# Patient Record
Sex: FEMALE | Race: WHITE
[De-identification: names, ages, dates, MRNs, and addresses within clinical notes are randomized per-mention and may not be internally consistent; named-entity substitution may affect disease eponyms.]

---

## 2017-02-06 ENCOUNTER — HOSPITAL ENCOUNTER (EMERGENCY)
Age: 37
Discharge: HOME | End: 2017-02-06
Payer: COMMERCIAL

## 2017-02-06 DIAGNOSIS — R10.2: Primary | ICD-10-CM

## 2017-02-06 PROCEDURE — 76856 US EXAM PELVIC COMPLETE: CPT

## 2017-02-06 PROCEDURE — 93975 VASCULAR STUDY: CPT

## 2017-02-06 PROCEDURE — 99283 EMERGENCY DEPT VISIT LOW MDM: CPT

## 2017-02-06 PROCEDURE — 99284 EMERGENCY DEPT VISIT MOD MDM: CPT

## 2017-02-06 PROCEDURE — 81001 URINALYSIS AUTO W/SCOPE: CPT

## 2017-02-06 PROCEDURE — 81025 URINE PREGNANCY TEST: CPT

## 2017-08-01 ENCOUNTER — HOSPITAL ENCOUNTER (EMERGENCY)
Dept: HOSPITAL 76 - ED | Age: 37
Discharge: HOME | End: 2017-08-01
Payer: COMMERCIAL

## 2017-08-01 VITALS — DIASTOLIC BLOOD PRESSURE: 80 MMHG | SYSTOLIC BLOOD PRESSURE: 117 MMHG

## 2017-08-01 DIAGNOSIS — Z87.42: ICD-10-CM

## 2017-08-01 DIAGNOSIS — N70.11: Primary | ICD-10-CM

## 2017-08-01 DIAGNOSIS — M79.7: ICD-10-CM

## 2017-08-01 LAB
ALBUMIN/GLOB SERPL: 1.1 {RATIO} (ref 1–2.2)
ANION GAP SERPL CALCULATED.4IONS-SCNC: 10 MMOL/L (ref 6–13)
BASOPHILS NFR BLD AUTO: 0.1 10^3/UL (ref 0–0.1)
BASOPHILS NFR BLD AUTO: 0.5 %
BILIRUB BLD-MCNC: 0.4 MG/DL (ref 0.2–1)
BUN SERPL-MCNC: 6 MG/DL (ref 6–20)
CALCIUM UR-MCNC: 9.6 MG/DL (ref 8.5–10.3)
CHLORIDE SERPL-SCNC: 101 MMOL/L (ref 101–111)
CO2 SERPL-SCNC: 27 MMOL/L (ref 21–32)
CREAT SERPLBLD-SCNC: 0.7 MG/DL (ref 0.4–1)
CUL URINE ADD CHARGE: (no result)
EOSINOPHIL # BLD AUTO: 0.2 10^3/UL (ref 0–0.7)
EOSINOPHIL NFR BLD AUTO: 1.9 %
ERYTHROCYTE [DISTWIDTH] IN BLOOD BY AUTOMATED COUNT: 13.7 % (ref 12–15)
GFRSERPLBLD MDRD-ARVRAT: 94 ML/MIN/{1.73_M2} (ref 89–?)
GLOBULIN SER-MCNC: 3.9 G/DL (ref 2.1–4.2)
GLUCOSE SERPL-MCNC: 109 MG/DL (ref 70–100)
HCT VFR BLD AUTO: 39.6 % (ref 37–47)
HGB UR QL STRIP: 13.6 G/DL (ref 12–16)
LIPASE SERPL-CCNC: 24 U/L (ref 22–51)
LYMPHOCYTES # SPEC AUTO: 2.3 10^3/UL (ref 1.5–3.5)
LYMPHOCYTES NFR BLD AUTO: 22 %
MCH RBC QN AUTO: 28.6 PG (ref 27–31)
MCHC RBC AUTO-ENTMCNC: 34.4 G/DL (ref 32–36)
MCV RBC AUTO: 83.1 FL (ref 81–99)
MONOCYTES # BLD AUTO: 0.6 10^3/UL (ref 0–1)
MONOCYTES NFR BLD AUTO: 5.5 %
NEUTROPHILS # BLD AUTO: 7.5 10^3/UL (ref 1.5–6.6)
NEUTROPHILS # SNV AUTO: 10.7 X10^3/UL (ref 4.8–10.8)
NEUTROPHILS NFR BLD AUTO: 70.1 %
NRBC # BLD AUTO: 0 /100WBC
PDW BLD AUTO: 7.4 FL (ref 7.9–10.8)
PH UR STRIP.AUTO: 6.5 PH (ref 5–7.5)
POTASSIUM SERPL-SCNC: 3.8 MMOL/L (ref 3.5–5)
PROT SPEC-MCNC: 8.3 G/DL (ref 6.7–8.2)
RBC MAR: 4.77 10^6/UL (ref 4.2–5.4)
SODIUM SERPLBLD-SCNC: 138 MMOL/L (ref 135–145)
SP GR UR STRIP.AUTO: <=1.005 (ref 1–1.03)
UA CHARGE (STRIP ONLY): YES
UA W/ MICROSCOPIC CHARGE: (no result)
UR CULTURE IF IND: (no result)
UROBILINOGEN UR STRIP.AUTO-MCNC: NEGATIVE MG/DL
WBC # BLD: 10.7 X10^3/UL

## 2017-08-01 PROCEDURE — 36415 COLL VENOUS BLD VENIPUNCTURE: CPT

## 2017-08-01 PROCEDURE — 81001 URINALYSIS AUTO W/SCOPE: CPT

## 2017-08-01 PROCEDURE — 96375 TX/PRO/DX INJ NEW DRUG ADDON: CPT

## 2017-08-01 PROCEDURE — 81003 URINALYSIS AUTO W/O SCOPE: CPT

## 2017-08-01 PROCEDURE — 96361 HYDRATE IV INFUSION ADD-ON: CPT

## 2017-08-01 PROCEDURE — 80053 COMPREHEN METABOLIC PANEL: CPT

## 2017-08-01 PROCEDURE — 99284 EMERGENCY DEPT VISIT MOD MDM: CPT

## 2017-08-01 PROCEDURE — 99283 EMERGENCY DEPT VISIT LOW MDM: CPT

## 2017-08-01 PROCEDURE — 74176 CT ABD & PELVIS W/O CONTRAST: CPT

## 2017-08-01 PROCEDURE — 83690 ASSAY OF LIPASE: CPT

## 2017-08-01 PROCEDURE — 85025 COMPLETE CBC W/AUTO DIFF WBC: CPT

## 2017-08-01 PROCEDURE — 87086 URINE CULTURE/COLONY COUNT: CPT

## 2017-08-01 PROCEDURE — 96374 THER/PROPH/DIAG INJ IV PUSH: CPT

## 2017-08-01 PROCEDURE — 96376 TX/PRO/DX INJ SAME DRUG ADON: CPT

## 2017-08-01 NOTE — CT PRELIMINARY REPORT
Accession: [id number]

Exam: CT Abdomen/Pelvis W/O

 

Impression 

1. No acute findings. 

2. Left adnexal complex cystic mass measures 5.4 x 3.7 x 6.3 cm. There is a complex cystic mass on th
e prior CT which measured 5.1 x 3.1 x 5.0 cm. The MR pelvis shows this complex mass appears to be hyd
rosalpinx.

 

3. No hydronephrosis. No urinary tract calculi. 

 

[facility]

 

SITE ID: [id number]

## 2017-08-01 NOTE — ED PHYSICIAN DOCUMENTATION
PD HPI ABD PAIN





- Stated complaint


Stated Complaint: L SIDE PAIN





- Chief complaint


Chief Complaint: Abd Pain





- History obtained from


History obtained from: Patient





- History of Present Illness


Timing - onset: Yesterday


Timing - details: Gradual onset


Quality: Sharp, Pain


Location: [facility]


Associated symptoms: Nausea.  No: Fever, Vomiting, Diarrhea, Dysuria, Vaginal 

bleeding





- Additional information


Additional information: 





Patient is a 37-year-old female who presents with left lower quadrant abdominal 

pain that started yesterday and is worse today.  She reports associated nausea, 

without vomiting or diarrhea.  She denies fever or dysuria.  Her last menstrual 

period was 2 weeks ago.  She is status post appendectomy and status post left 

nephrectomy.  She also has undergone laparoscopic surgery in March 2017 for 

left hydrosalpinx.





Review of Systems


Constitutional: denies: Fever


Ears: denies: Tinnitus/ringing


Nose: denies: Congestion


Throat: denies: Sore throat


Cardiac: denies: Chest pain / pressure


Respiratory: denies: Dyspnea, Cough


GI: reports: Abdominal Pain, Nausea.  denies: Vomiting, Diarrhea


: reports: LMP (2 weeks ago).  denies: Dysuria, Vaginal bleeding


Skin: denies: Rash


Musculoskeletal: denies: Back pain, Extremity swelling


Neurologic: denies: Focal weakness, Numbness, Headache





PD PAST MEDICAL HISTORY





- Past Medical History


Past Medical History: Yes


Cardiovascular: None


Respiratory: None


Neuro: None


Endocrine/Autoimmune: None


GYN: Endometriosis


: Other


Musculoskeletal: Fibromyalgia





- Past Surgical History


Past Surgical History: Yes


General: Appendectomy, Other (Left nephrectomy)


/GYN: Tubal ligation





- Present Medications


Home Medications: 


 Ambulatory Orders











 Medication  Instructions  Recorded  Confirmed


 


Ranitidine HCl 150 mg TID 09/19/16 08/01/17


 


Gabapentin [Neurontin] 100 mg PO DAILY 08/01/17 08/01/17


 


HYDROcod/ACETAM 5/325 [Vicodin 1 - 2 ea PO Q6H PRN #20 tablet 08/01/17 





5/325]   


 


Zolpidem [Ambien] 5 mg PO DAILY 08/01/17 08/01/17














- Allergies


Allergies/Adverse Reactions: 


 Allergies











Allergy/AdvReac Type Severity Reaction Status Date / Time


 


Penicillins Allergy  Emesis Verified 08/01/17 16:21














- Social History


Does the pt smoke?: No


Smoking Status: Never smoker


Does the pt drink ETOH?: No


Does the pt have substance abuse?: No





- Immunizations


Immunizations are current?: Yes





- POLST


Patient has POLST: No





PD ED PE NORMAL





- Vitals


Vital signs reviewed: Yes (normal)





- General


General: Alert and oriented X 3, Well developed/nourished





- HEENT


HEENT: Atraumatic, Pharynx benign





- Neck


Neck: No adenopathy, No JVD





- Cardiac


Cardiac: RRR, No murmur





- Respiratory


Respiratory: No respiratory distress, Clear bilaterally





- Abdomen


Abdomen: Normal bowel sounds, Soft, No organomegaly, Other (Mild tenderness to 

palpation across the lower abdomen, slightly more on the left than the right.  

No rebound tenderness or guarding.)





- Back


Back: No CVA TTP





- Derm


Derm: No rash





- Extremities


Extremities: No edema, No calf tenderness / cord





- Neuro


Neuro: Alert and oriented X 3, No motor deficit, Normal speech





Results





- Vitals


Vitals: 


 Vital Signs - 24 hr











  08/01/17 08/01/17





  15:47 19:35


 


Temperature 36 C L 36.5 C


 


Heart Rate 78 70


 


Respiratory 16 18





Rate  


 


Blood Pressure 103/72 117/80


 


O2 Saturation 98 100








 Oxygen











O2 Source                      Room air

















- Labs


Labs: 


 Laboratory Tests











  08/01/17 08/01/17 08/01/17





  14:45 17:26 17:26


 


WBC   10.7 


 


RBC   4.77 


 


Hgb   13.6 


 


Hct   39.6 


 


MCV   83.1 


 


MCH   28.6 


 


MCHC   34.4 


 


RDW   13.7 


 


Plt Count   437 


 


MPV   7.4 L 


 


Neut #   7.5 H 


 


Lymph #   2.3 


 


Mono #   0.6 


 


Eos #   0.2 


 


Baso #   0.1 


 


Absolute Nucleated RBC   0.00 


 


Nucleated RBCs   0.0 


 


Sodium    138


 


Potassium    3.8


 


Chloride    101


 


Carbon Dioxide    27


 


Anion Gap    10.0


 


BUN    6


 


Creatinine    0.7


 


Estimated GFR (MDRD)    94


 


Glucose    109 H


 


Calcium    9.6


 


Total Bilirubin    0.4


 


AST    17


 


ALT    17


 


Alkaline Phosphatase    58


 


Total Protein    8.3 H


 


Albumin    4.4


 


Globulin    3.9


 


Albumin/Globulin Ratio    1.1


 


Lipase    24


 


Urine Color  YELLOW  


 


Urine Clarity  CLEAR  


 


Urine pH  6.5  


 


Ur Specific Gravity  <=1.005  


 


Urine Protein  NEGATIVE  


 


Urine Glucose (UA)  NEGATIVE  


 


Urine Ketones  NEGATIVE  


 


Urine Occult Blood  NEGATIVE  


 


Urine Nitrite  NEGATIVE  


 


Urine Bilirubin  NEGATIVE  


 


Urine Urobilinogen  0.2 (NORMAL)  


 


Ur Leukocyte Esterase  NEGATIVE  


 


Ur Microscopic Review  NOT INDICATED  


 


Urine Culture Comments  NOT INDICATED  














- Rads (name of study)


  ** CT abd/pelvis w/o


Radiology: Prelim report reviewed, EMP read contemporaneously, See rad report (

No acute findings.  Left adnexal complex cystic mass measures 5.4 x 3.7 x 6.3 

cm.  There is a complex cystic mass on the prior CT with which measured 5.1 x 

3.1 x 5.0 cm.  The MR pelvis shows this complex mass appears to be 

hydrosalpinx.  No hydronephrosis.  No urinary tract calculi.)





PD MEDICAL DECISION MAKING





- ED course


Complexity details: reviewed old records, reviewed results, re-evaluated patient

, considered differential, d/w patient, d/w family


ED course: 





The patient's presentation with left lower quadrant/pelvic pain is most 

consistent with cystic mass consistent with left hydrosalpinx, seen on CT scan.

  This is similar to the CT scan that was done in March prior to her 

laparoscopic surgery.  CBC reveals a normal white blood cell count and normal 

hemoglobin and hematocrit.  Urinalysis is negative.


Treatment in the emergency department included administration of normal saline 

1 L IV, hydromorphone 0.5 mg IV 2, and Zofran 4 mg IV 2.  This significantly 

improved the patient's discomfort.  She is being discharged with prescription 

for Vicodin 20 tablets.  I discussed with her and her  the results of 

the CT scan, symptomatic treatment and outpatient follow-up, as well as 

potentially worrisome signs or symptoms that should prompt reevaluation in the 

emergency department.





Departure





- Departure


Disposition: 01 Home, Self Care


Clinical Impression: 


 Hydrosalpinx


Condition: Stable


Instructions:  ED Pelvic Pain [facility]


Follow-Up: 


Magalys Henley MD [Provider Admit Priv/Credential] - 


Prescriptions: 


HYDROcod/ACETAM 5/325 [Vicodin 5/325] 1 - 2 ea PO Q6H PRN #20 tablet


 PRN Reason: Pain


Comments: 


You can use Vicodin as prescribed if needed for pain.


Follow-up with your gynecologist.  Call to schedule next available appointment.


Return to the emergency department if you develop increasing pain, persistent 

vomiting, or otherwise worsening symptoms.


Discharge Date/Time: 08/01/17 20:49

## 2017-08-01 NOTE — CT REPORT
EXAM:

CT ABDOMEN AND PELVIS (CT KUB)

 

EXAM DATE: 8/1/2017 05:54 PM.

 

CLINICAL HISTORY: Lower abdominal pain, left right. 

 

COMPARISONS: CT abdomen and pelvis 09/19/2016. 10/3/16 MR pelvis

 

TECHNIQUE: Routine axial helical CT imaging was performed through the abdomen and pelvis without IV c
ontrast. Reconstructions: Coronal and sagittal.

 

In accordance with CT protocol optimization, one or more of the following dose reduction techniques w
ere utilized for this exam: automated exposure control, adjustment of mA and/or KV based on patient s
ize, or use of iterative reconstructive technique.

 

FINDINGS: 

Lung Bases: No acute findings.

 

Liver: No liver masses are seen.

 

Gallbladder: Normal.

 

Bile ducts: Normal

 

Pancreas: Normal.

 

Spleen: Normal.

 

Adrenals: Normal.

 

Kidneys: No renal calculi. No hydronephrosis. No ureteral or bladder calculi are seen. No hydroureter
.

 

Bowel: No evidence for bowel obstruction. No acute bowel findings are seen. No free fluid or free air
.

 

No abdominal aortic aneurysm.

 

Pelvic organs: Uterus is retroverted. Left adnexal complex cystic mass measures 5.4 x 3.7 x 6.3 cm. T
here is a complex cystic mass on the prior CT which measured 5.1 x 3.1 x 5.0 cm. The MR pelvis shows 
this complex mass appears to be hydrosalpinx. The right ovary appears unremarkable. The bladder appea
rs unremarkable.

 

 

No acute bone findings.

 

Impression 

1. No acute findings. 

2. Left adnexal complex cystic mass measures 5.4 x 3.7 x 6.3 cm. There is a complex cystic mass on th
e prior CT which measured 5.1 x 3.1 x 5.0 cm. The MR pelvis shows this complex mass appears to be hyd
rosalpinx.

 

3. No hydronephrosis. No urinary tract calculi. 

 

[facility]

Referring Provider Line: [phone]

 

SITE ID: [id number]

## 2017-09-06 ENCOUNTER — HOSPITAL ENCOUNTER (EMERGENCY)
Dept: HOSPITAL 76 - ED | Age: 37
Discharge: HOME | End: 2017-09-06
Payer: COMMERCIAL

## 2017-09-06 VITALS — DIASTOLIC BLOOD PRESSURE: 60 MMHG | SYSTOLIC BLOOD PRESSURE: 100 MMHG

## 2017-09-06 DIAGNOSIS — M79.7: ICD-10-CM

## 2017-09-06 DIAGNOSIS — Z90.5: ICD-10-CM

## 2017-09-06 DIAGNOSIS — Z90.79: ICD-10-CM

## 2017-09-06 DIAGNOSIS — N83.202: Primary | ICD-10-CM

## 2017-09-06 LAB
ALBUMIN/GLOB SERPL: 1.3 {RATIO} (ref 1–2.2)
ANION GAP SERPL CALCULATED.4IONS-SCNC: 8 MMOL/L (ref 6–13)
BASOPHILS NFR BLD AUTO: 0.1 10^3/UL (ref 0–0.1)
BASOPHILS NFR BLD AUTO: 0.7 %
BILIRUB BLD-MCNC: 0.3 MG/DL (ref 0.2–1)
BUN SERPL-MCNC: 6 MG/DL (ref 6–20)
CALCIUM UR-MCNC: 9.4 MG/DL (ref 8.5–10.3)
CHLORIDE SERPL-SCNC: 103 MMOL/L (ref 101–111)
CO2 SERPL-SCNC: 26 MMOL/L (ref 21–32)
CREAT SERPLBLD-SCNC: 0.7 MG/DL (ref 0.4–1)
CUL URINE ADD CHARGE: (no result)
EOSINOPHIL # BLD AUTO: 0.2 10^3/UL (ref 0–0.7)
EOSINOPHIL NFR BLD AUTO: 2.1 %
ERYTHROCYTE [DISTWIDTH] IN BLOOD BY AUTOMATED COUNT: 13.8 % (ref 12–15)
GFRSERPLBLD MDRD-ARVRAT: 94 ML/MIN/{1.73_M2} (ref 89–?)
GLOBULIN SER-MCNC: 3.5 G/DL (ref 2.1–4.2)
GLUCOSE SERPL-MCNC: 99 MG/DL (ref 70–100)
HCG UR QL: NEGATIVE
HCT VFR BLD AUTO: 40.4 % (ref 37–47)
HGB UR QL STRIP: 13.5 G/DL (ref 12–16)
LIPASE SERPL-CCNC: 28 U/L (ref 22–51)
LYMPHOCYTES # SPEC AUTO: 2.4 10^3/UL (ref 1.5–3.5)
LYMPHOCYTES NFR BLD AUTO: 22.5 %
MCH RBC QN AUTO: 27.5 PG (ref 27–31)
MCHC RBC AUTO-ENTMCNC: 33.5 G/DL (ref 32–36)
MCV RBC AUTO: 82.2 FL (ref 81–99)
MONOCYTES # BLD AUTO: 0.6 10^3/UL (ref 0–1)
MONOCYTES NFR BLD AUTO: 5.8 %
NEUTROPHILS # BLD AUTO: 7.4 10^3/UL (ref 1.5–6.6)
NEUTROPHILS # SNV AUTO: 10.8 X10^3/UL (ref 4.8–10.8)
NEUTROPHILS NFR BLD AUTO: 68.9 %
NRBC # BLD AUTO: 0.1 /100WBC
PDW BLD AUTO: 7.5 FL (ref 7.9–10.8)
PH UR STRIP.AUTO: 6 PH (ref 5–7.5)
POTASSIUM SERPL-SCNC: 3.8 MMOL/L (ref 3.5–5)
PROT SPEC-MCNC: 7.9 G/DL (ref 6.7–8.2)
RBC MAR: 4.91 10^6/UL (ref 4.2–5.4)
SODIUM SERPLBLD-SCNC: 137 MMOL/L (ref 135–145)
SP GR UR STRIP.AUTO: <=1.005 (ref 1–1.03)
UA CHARGE (STRIP ONLY): YES
UA W/ MICROSCOPIC CHARGE: (no result)
UR CULTURE IF IND: (no result)
UROBILINOGEN UR STRIP.AUTO-MCNC: NEGATIVE MG/DL
WBC # BLD: 10.8 X10^3/UL

## 2017-09-06 PROCEDURE — 36415 COLL VENOUS BLD VENIPUNCTURE: CPT

## 2017-09-06 PROCEDURE — 87086 URINE CULTURE/COLONY COUNT: CPT

## 2017-09-06 PROCEDURE — 99283 EMERGENCY DEPT VISIT LOW MDM: CPT

## 2017-09-06 PROCEDURE — 81001 URINALYSIS AUTO W/SCOPE: CPT

## 2017-09-06 PROCEDURE — 81025 URINE PREGNANCY TEST: CPT

## 2017-09-06 PROCEDURE — 96376 TX/PRO/DX INJ SAME DRUG ADON: CPT

## 2017-09-06 PROCEDURE — 99284 EMERGENCY DEPT VISIT MOD MDM: CPT

## 2017-09-06 PROCEDURE — 96374 THER/PROPH/DIAG INJ IV PUSH: CPT

## 2017-09-06 PROCEDURE — 85025 COMPLETE CBC W/AUTO DIFF WBC: CPT

## 2017-09-06 PROCEDURE — 80053 COMPREHEN METABOLIC PANEL: CPT

## 2017-09-06 PROCEDURE — 96375 TX/PRO/DX INJ NEW DRUG ADDON: CPT

## 2017-09-06 PROCEDURE — 83690 ASSAY OF LIPASE: CPT

## 2017-09-06 PROCEDURE — 81003 URINALYSIS AUTO W/O SCOPE: CPT

## 2017-09-06 PROCEDURE — 83036 HEMOGLOBIN GLYCOSYLATED A1C: CPT

## 2017-09-06 NOTE — ED PHYSICIAN DOCUMENTATION
PD HPI CHEST PAIN





- Stated complaint


Stated Complaint: N/V/FEVER





- Chief complaint


Chief Complaint: Abd Pain





- History obtained from


History obtained from: Patient





- Additional information


Additional information: 


Patient is a 37-year-old female who has had bilateral salpingectomy in March 

for severe PID.  She has a left ovarian cyst that is been present off and on 

for years that is quite large and pending outpatient removal in the future.  

She presents with a complaint of nausea, mild vomiting, feeling of dizziness 

when she stands decreased p.o. intake and hot and cold chills.  She denies any 

constipation, diarrhea or lower urinary symptoms.  She has a single kidney so 

this is also a concern.  There is no history of kidney stones.  She denies any 

cough, or ear nose and throat symptoms.





There are no complaints of vaginal bleeding or discharge.





Review of systems:





For pertinent positive and negatives in the review of systems please see the 

history of present illness, otherwise all other systems have been reviewed and 

are negative.





Dragon disclaimer:





Parts of this medical record were created using voice recognition technology.  

Because of the inherent limitations of this system, occasional same sounding 

word substitutions do occur and persist despite proofreading.  Please read the 

document for context.











Review of Systems


Constitutional: reports: Fever, Chills


GI: reports: Abdominal Pain, Nausea, Vomiting





PD PAST MEDICAL HISTORY





- Past Medical History


Cardiovascular: None


Respiratory: None


Neuro: None


Endocrine/Autoimmune: None


GYN: Endometriosis


: Other


Musculoskeletal: Fibromyalgia





- Past Surgical History


Past Surgical History: Yes


General: Appendectomy, Other


/GYN: Tubal ligation





- Present Medications


Home Medications: 


 Ambulatory Orders











 Medication  Instructions  Recorded  Confirmed


 


Ranitidine HCl 150 mg TID 09/19/16 09/06/17


 


HYDROcod/ACETAM 5/325 [Vicodin 1 - 2 ea PO Q6H PRN #20 tablet 08/01/17 09/06/17





5/325]   


 


Zolpidem [Ambien] 5 mg PO DAILY 08/01/17 09/06/17


 


Ondansetron Odt [Zofran] 4 mg TL Q6H PRN #14 tablet 09/06/17 


 


oxyCODONE/ACET 5/325 [Percocet 5 1 each PO Q4-6H PRN #16 tablet 09/06/17 





mg/325 mg]   














- Allergies


Allergies/Adverse Reactions: 


 Allergies











Allergy/AdvReac Type Severity Reaction Status Date / Time


 


Penicillins Allergy  Emesis Verified 08/01/17 16:21














- Social History


Does the pt smoke?: No


Smoking Status: Never smoker


Does the pt drink ETOH?: No


Does the pt have substance abuse?: No





- Immunizations


Immunizations are current?: Yes





- POLST


Patient has POLST: No





PD ED PE NORMAL





- Vitals


Vital signs reviewed: Yes





- General


General: Alert and oriented X 3, No acute distress, Well developed/nourished





- HEENT


HEENT: Atraumatic, PERRL





- Neck


Neck: Supple, no meningeal sign, No bony TTP, No JVD





- Cardiac


Cardiac: RRR, No gallop





- Respiratory


Respiratory: No respiratory distress





- Abdomen


Abdomen: Normal bowel sounds, Soft, Non tender, Non distended, Other ( left 

lower quadrant tenderness on deep palpation)





- Derm


Derm: Normal color, Warm and dry, No rash, Other





- Extremities


Extremities: No deformity, No tenderness to palpate, Normal ROM s pain, No edema





- Neuro


Neuro: Alert and oriented X 3





- Psych


Psych: Normal mood, Normal affect





Results





- Vitals


Vitals: 


 Vital Signs - 24 hr











  09/06/17 09/06/17





  17:53 20:49


 


Temperature 36.3 C L 


 


Heart Rate 77 82


 


Respiratory 16 18





Rate  


 


Blood Pressure 111/70 100/53 L


 


O2 Saturation 97 98








 Oxygen











O2 Source                      Room air

















- Labs


Labs: 


 Laboratory Tests











  09/06/17 09/06/17 09/06/17





  18:00 19:45 19:45


 


WBC   10.8 


 


RBC   4.91 


 


Hgb   13.5 


 


Hct   40.4 


 


MCV   82.2 


 


MCH   27.5 


 


MCHC   33.5 


 


RDW   13.8 


 


Plt Count   437 


 


MPV   7.5 L 


 


Neut #   7.4 H 


 


Lymph #   2.4 


 


Mono #   0.6 


 


Eos #   0.2 


 


Baso #   0.1 


 


Absolute Nucleated RBC   0.01 


 


Nucleated RBCs   0.1 


 


Sodium    137


 


Potassium    3.8


 


Chloride    103


 


Carbon Dioxide    26


 


Anion Gap    8.0


 


BUN    6


 


Creatinine    0.7


 


Estimated GFR (MDRD)    94


 


Glucose    99


 


Calcium    9.4


 


Total Bilirubin    0.3


 


AST    18


 


ALT    24


 


Alkaline Phosphatase    59


 


Total Protein    7.9


 


Albumin    4.4


 


Globulin    3.5


 


Albumin/Globulin Ratio    1.3


 


Lipase    28


 


Urine Color  YELLOW  


 


Urine Clarity  CLEAR  


 


Urine pH  6.0  


 


Ur Specific Gravity  <=1.005  


 


Urine Protein  NEGATIVE  


 


Urine Glucose (UA)  NEGATIVE  


 


Urine Ketones  NEGATIVE  


 


Urine Occult Blood  TRACE-INTA  


 


Urine Nitrite  NEGATIVE  


 


Urine Bilirubin  NEGATIVE  


 


Urine Urobilinogen  0.2 (NORMAL)  


 


Ur Leukocyte Esterase  NEGATIVE  


 


Ur Microscopic Review  NOT INDICATED  


 


Urine Culture Comments  NOT INDICATED  


 


Urine HCG, Qual  NEGATIVE  














PD MEDICAL DECISION MAKING





- ED course


ED course: 


Patient is a 37-year-old female with known left ovarian cyst pending surgical 

removal.  She presents with nausea and left-sided abdominal pain.  On exam she 

has mild left lower quadrant tenderness.  She has not had any significant 

vomiting and according to the patient she has had a bilateral salpingectomy so 

ovarian torsion is less likely.  She has not had any vomiting here so did not 

feel an ultrasound was required tonight.  She actually looks pretty good and 

was given IV fluids, pain and nausea medications.  Routine labs chemistries and 

urinalysis were performed and are unremarkable.  The patient looks much better 

after getting hydration at this point is stable to be discharged to home.





Disposition: To home





Clinical impression:


1.  Left ovarian cyst


2.  Left lower quadrant abdominal pain with nausea








Departure





- Departure


Disposition: 01 Home, Self Care


Clinical Impression: 


 Cyst of ovary


Condition: Good


Instructions:  ED Cyst Ovarian


Follow-Up: 


DEVIKA TRENT MD [Primary Care Provider] - 


Prescriptions: 


oxyCODONE/ACET 5/325 [Percocet 5 mg/325 mg] 1 each PO Q4-6H PRN #16 tablet


 PRN Reason: Pain


Ondansetron Odt [Zofran] 4 mg TL Q6H PRN #14 tablet


 PRN Reason: Nausea / Vomiting

## 2017-09-16 ENCOUNTER — HOSPITAL ENCOUNTER (EMERGENCY)
Dept: HOSPITAL 76 - ED | Age: 37
Discharge: HOME | End: 2017-09-16
Payer: COMMERCIAL

## 2017-09-16 VITALS — DIASTOLIC BLOOD PRESSURE: 75 MMHG | SYSTOLIC BLOOD PRESSURE: 117 MMHG

## 2017-09-16 DIAGNOSIS — N83.209: Primary | ICD-10-CM

## 2017-09-16 LAB
ALBUMIN/GLOB SERPL: 1.2 {RATIO} (ref 1–2.2)
ANION GAP SERPL CALCULATED.4IONS-SCNC: 9 MMOL/L (ref 6–13)
BASOPHILS NFR BLD AUTO: 0.1 10^3/UL (ref 0–0.1)
BASOPHILS NFR BLD AUTO: 0.5 %
BILIRUB BLD-MCNC: 0.3 MG/DL (ref 0.2–1)
BUN SERPL-MCNC: 7 MG/DL (ref 6–20)
CALCIUM UR-MCNC: 9.3 MG/DL (ref 8.5–10.3)
CHLORIDE SERPL-SCNC: 104 MMOL/L (ref 101–111)
CO2 SERPL-SCNC: 27 MMOL/L (ref 21–32)
CREAT SERPLBLD-SCNC: 0.6 MG/DL (ref 0.4–1)
CUL URINE ADD CHARGE: (no result)
EOSINOPHIL # BLD AUTO: 0.2 10^3/UL (ref 0–0.7)
EOSINOPHIL NFR BLD AUTO: 1.6 %
ERYTHROCYTE [DISTWIDTH] IN BLOOD BY AUTOMATED COUNT: 13.6 % (ref 12–15)
GFRSERPLBLD MDRD-ARVRAT: 112 ML/MIN/{1.73_M2} (ref 89–?)
GLOBULIN SER-MCNC: 3.5 G/DL (ref 2.1–4.2)
GLUCOSE SERPL-MCNC: 95 MG/DL (ref 70–100)
HCT VFR BLD AUTO: 36.9 % (ref 37–47)
HGB UR QL STRIP: 12.5 G/DL (ref 12–16)
LIPASE SERPL-CCNC: 29 U/L (ref 22–51)
LYMPHOCYTES # SPEC AUTO: 2.8 10^3/UL (ref 1.5–3.5)
LYMPHOCYTES NFR BLD AUTO: 23.2 %
MCH RBC QN AUTO: 27.8 PG (ref 27–31)
MCHC RBC AUTO-ENTMCNC: 33.9 G/DL (ref 32–36)
MCV RBC AUTO: 82.1 FL (ref 81–99)
MONOCYTES # BLD AUTO: 0.6 10^3/UL (ref 0–1)
MONOCYTES NFR BLD AUTO: 5.3 %
NEUTROPHILS # BLD AUTO: 8.3 10^3/UL (ref 1.5–6.6)
NEUTROPHILS # SNV AUTO: 12 X10^3/UL (ref 4.8–10.8)
NEUTROPHILS NFR BLD AUTO: 69.4 %
NRBC # BLD AUTO: 0 /100WBC
PDW BLD AUTO: 7.6 FL (ref 7.9–10.8)
PH UR STRIP.AUTO: 5.5 PH (ref 5–7.5)
POTASSIUM SERPL-SCNC: 3.5 MMOL/L (ref 3.5–5)
PROT SPEC-MCNC: 7.8 G/DL (ref 6.7–8.2)
RBC MAR: 4.5 10^6/UL (ref 4.2–5.4)
SODIUM SERPLBLD-SCNC: 140 MMOL/L (ref 135–145)
SP GR UR STRIP.AUTO: <=1.005 (ref 1–1.03)
UA CHARGE (STRIP ONLY): YES
UA W/ MICROSCOPIC CHARGE: (no result)
UR CULTURE IF IND: (no result)
UROBILINOGEN UR STRIP.AUTO-MCNC: NEGATIVE MG/DL
WBC # BLD: 12 X10^3/UL

## 2017-09-16 PROCEDURE — 96372 THER/PROPH/DIAG INJ SC/IM: CPT

## 2017-09-16 PROCEDURE — 81003 URINALYSIS AUTO W/O SCOPE: CPT

## 2017-09-16 PROCEDURE — 81001 URINALYSIS AUTO W/SCOPE: CPT

## 2017-09-16 PROCEDURE — 99283 EMERGENCY DEPT VISIT LOW MDM: CPT

## 2017-09-16 PROCEDURE — 99284 EMERGENCY DEPT VISIT MOD MDM: CPT

## 2017-09-16 PROCEDURE — 36415 COLL VENOUS BLD VENIPUNCTURE: CPT

## 2017-09-16 PROCEDURE — 85025 COMPLETE CBC W/AUTO DIFF WBC: CPT

## 2017-09-16 PROCEDURE — 83690 ASSAY OF LIPASE: CPT

## 2017-09-16 PROCEDURE — 87086 URINE CULTURE/COLONY COUNT: CPT

## 2017-09-16 PROCEDURE — 80053 COMPREHEN METABOLIC PANEL: CPT

## 2017-09-16 RX ADMIN — KETOROLAC TROMETHAMINE STA MG: 30 INJECTION, SOLUTION INTRAMUSCULAR at 20:08

## 2017-09-16 NOTE — ED PHYSICIAN DOCUMENTATION
PD HPI FEMALE 





- Stated complaint


Stated Complaint: FEVER/NAUSEA





- Chief complaint


Chief Complaint: Abd Pain





- History obtained from


History obtained from: Patient





- History of Present Illness


Timing - onset: How many days ago (has had pelvic pain for several weeks Dx due 

to ovarian cysts. Has had worse similar pain the past few days. No abrupt pain.)


Timing - duration: Days


Timing - details: Gradual onset, Still present, Waxing and waning


Associated symptoms: Pelvic pain.  No: Vaginal bleeding, Vaginal discharge, 

Genital sore/lesion, Dysuria


Contributing factors: No: Exposed to STD


OB-GYN History: Ovarian cysts


Similar symptoms before: Diagnosis (ovarian cyst pain)


Recently seen: Other (has appt with GYN in a few days this coming week.)





Review of Systems


Constitutional: denies: Fever, Chills


Nose: denies: Rhinorrhea / runny nose, Congestion


Throat: denies: Sore throat


Respiratory: denies: Cough


GI: denies: Nausea, Vomiting, Diarrhea


Skin: denies: Rash, Lesions





PD PAST MEDICAL HISTORY





- Past Medical History


Cardiovascular: None


Respiratory: None


Neuro: None


Endocrine/Autoimmune: None


GYN: Endometriosis


: Other


Musculoskeletal: Fibromyalgia





- Past Surgical History


Past Surgical History: Yes


General: Appendectomy, Other


/GYN: Tubal ligation





- Present Medications


Home Medications: 


 Ambulatory Orders











 Medication  Instructions  Recorded  Confirmed


 


Ranitidine HCl 150 mg TID 09/19/16 09/16/17


 


Zolpidem [Ambien] 5 mg PO DAILY 08/01/17 09/16/17


 


Ondansetron Odt [Zofran] 4 mg TL Q6H PRN #14 tablet 09/06/17 09/16/17


 


Ondansetron Odt [Zofran] 4 mg TL Q6H PRN #15 tablet 09/16/17 


 


Oxycodone HCl/Acetaminophen 1 each PO Q6H PRN #15 tablet 09/16/17 





[Percocet 5-325 mg Tablet]   














- Allergies


Allergies/Adverse Reactions: 


 Allergies











Allergy/AdvReac Type Severity Reaction Status Date / Time


 


Penicillins Allergy  Emesis Verified 08/01/17 16:21














- Social History


Does the pt smoke?: No


Smoking Status: Never smoker


Does the pt drink ETOH?: No


Does the pt have substance abuse?: No





- Immunizations


Immunizations are current?: Yes





- POLST


Patient has POLST: No





PD ED PE NORMAL





- Vitals


Vital signs reviewed: Yes





- General


General: Alert and oriented X 3, No acute distress, Well developed/nourished





- Cardiac


Cardiac: RRR, No murmur





- Respiratory


Respiratory: Clear bilaterally





- Abdomen


Abdomen: Normal bowel sounds, Soft, Non distended, Other





- Female 


Female : Deferred





- Rectal


Rectal: Deferred





- Back


Back: No CVA TTP





- Derm


Derm: Normal color, Warm and dry, No rash





- Neuro


Neuro: Alert and oriented X 3, No motor deficit, Normal speech





Results





- Vitals


Vitals: 


 Oxygen











O2 Source                      Room air

















- Labs


Labs: 


 Laboratory Tests











  09/16/17 09/16/17 09/16/17





  18:05 20:10 20:10


 


WBC   12.0 H 


 


RBC   4.50 


 


Hgb   12.5 


 


Hct   36.9 L 


 


MCV   82.1 


 


MCH   27.8 


 


MCHC   33.9 


 


RDW   13.6 


 


Plt Count   455 H 


 


MPV   7.6 L 


 


Neut #   8.3 H 


 


Lymph #   2.8 


 


Mono #   0.6 


 


Eos #   0.2 


 


Baso #   0.1 


 


Absolute Nucleated RBC   0.00 


 


Nucleated RBCs   0.0 


 


Sodium    140


 


Potassium    3.5


 


Chloride    104


 


Carbon Dioxide    27


 


Anion Gap    9.0


 


BUN    7


 


Creatinine    0.6


 


Estimated GFR (MDRD)    112


 


Glucose    95


 


Calcium    9.3


 


Total Bilirubin    0.3


 


AST    15


 


ALT    12


 


Alkaline Phosphatase    52


 


Total Protein    7.8


 


Albumin    4.3


 


Globulin    3.5


 


Albumin/Globulin Ratio    1.2


 


Lipase    29


 


Urine Color  STRAW  


 


Urine Clarity  CLEAR  


 


Urine pH  5.5  


 


Ur Specific Gravity  <=1.005  


 


Urine Protein  NEGATIVE  


 


Urine Glucose (UA)  NEGATIVE  


 


Urine Ketones  NEGATIVE  


 


Urine Occult Blood  TRACE-LYSE  


 


Urine Nitrite  NEGATIVE  


 


Urine Bilirubin  NEGATIVE  


 


Urine Urobilinogen  0.2 (NORMAL)  


 


Ur Leukocyte Esterase  NEGATIVE  


 


Ur Microscopic Review  NOT INDICATED  


 


Urine Culture Comments  NOT INDICATED  














PD MEDICAL DECISION MAKING





- ED course


Complexity details: considered differential (presume similar cyst pain. Not 

abrupt and not severe, so does not seem like torsion. Did not see need for 

recurrent imaging. Will be seeing specialist soon. ), d/w patient





Departure





- Departure


Disposition: 01 Home, Self Care


Clinical Impression: 


 Pelvic pain





Cyst of ovary


Qualifiers:


 Laterality: unspecified laterality Qualified Code(s): N83.209 - Unspecified 

ovarian cyst, unspecified side


Condition: Stable


Record reviewed to determine appropriate education?: Yes


Instructions:  ED Pelvic Pain [facility]


Follow-Up: 


Sonido Causey MD [Primary Care Provider] - 


Prescriptions: 


Oxycodone HCl/Acetaminophen [Percocet 5-325 mg Tablet] 1 each PO Q6H PRN #15 

tablet


 PRN Reason: Pain


Ondansetron Odt [Zofran] 4 mg TL Q6H PRN #15 tablet


 PRN Reason: Nausea / Vomiting


Comments: 


Tylenol or ibuprofen if needed for mild pains.  Had Percocet if needed.  Use 

ondansetron if needed for nausea.  Return if worsening pain, fevers, vomiting, 

bleeding, other concerns.


Discharge Date/Time: 09/16/17 21:43

## 2017-12-11 ENCOUNTER — HOSPITAL ENCOUNTER (EMERGENCY)
Dept: HOSPITAL 76 - ED | Age: 37
Discharge: HOME | End: 2017-12-11
Payer: COMMERCIAL

## 2017-12-11 VITALS — DIASTOLIC BLOOD PRESSURE: 76 MMHG | SYSTOLIC BLOOD PRESSURE: 125 MMHG

## 2017-12-11 DIAGNOSIS — J06.9: Primary | ICD-10-CM

## 2017-12-11 DIAGNOSIS — M79.7: ICD-10-CM

## 2017-12-11 DIAGNOSIS — Z90.710: ICD-10-CM

## 2017-12-11 DIAGNOSIS — B97.89: ICD-10-CM

## 2017-12-11 LAB
ALBUMIN/GLOB SERPL: 1.2 {RATIO} (ref 1–2.2)
ANION GAP SERPL CALCULATED.4IONS-SCNC: 9 MMOL/L (ref 6–13)
BASOPHILS NFR BLD AUTO: 0.1 10^3/UL (ref 0–0.1)
BASOPHILS NFR BLD AUTO: 0.7 %
BILIRUB BLD-MCNC: 0.4 MG/DL (ref 0.2–1)
BUN SERPL-MCNC: 8 MG/DL (ref 6–20)
CALCIUM UR-MCNC: 9.5 MG/DL (ref 8.5–10.3)
CHLORIDE SERPL-SCNC: 104 MMOL/L (ref 101–111)
CO2 SERPL-SCNC: 25 MMOL/L (ref 21–32)
CREAT SERPLBLD-SCNC: 0.6 MG/DL (ref 0.4–1)
CUL URINE ADD CHARGE: (no result)
EOSINOPHIL # BLD AUTO: 0.1 10^3/UL (ref 0–0.7)
EOSINOPHIL NFR BLD AUTO: 1.4 %
ERYTHROCYTE [DISTWIDTH] IN BLOOD BY AUTOMATED COUNT: 14.5 % (ref 12–15)
GFRSERPLBLD MDRD-ARVRAT: 112 ML/MIN/{1.73_M2} (ref 89–?)
GLOBULIN SER-MCNC: 3.7 G/DL (ref 2.1–4.2)
GLUCOSE SERPL-MCNC: 108 MG/DL (ref 70–100)
HCT VFR BLD AUTO: 36.6 % (ref 37–47)
HGB UR QL STRIP: 12.3 G/DL (ref 12–16)
LIPASE SERPL-CCNC: 24 U/L (ref 22–51)
LYMPHOCYTES # SPEC AUTO: 2 10^3/UL (ref 1.5–3.5)
LYMPHOCYTES NFR BLD AUTO: 22.7 %
MCH RBC QN AUTO: 26.3 PG (ref 27–31)
MCHC RBC AUTO-ENTMCNC: 33.6 G/DL (ref 32–36)
MCV RBC AUTO: 78.3 FL (ref 81–99)
MONOCYTES # BLD AUTO: 0.5 10^3/UL (ref 0–1)
MONOCYTES NFR BLD AUTO: 5.5 %
NEUTROPHILS # BLD AUTO: 6.2 10^3/UL (ref 1.5–6.6)
NEUTROPHILS # SNV AUTO: 8.9 X10^3/UL (ref 4.8–10.8)
NEUTROPHILS NFR BLD AUTO: 69.7 %
NRBC # BLD AUTO: 0 /100WBC
PDW BLD AUTO: 7.4 FL (ref 7.9–10.8)
PH UR STRIP.AUTO: 6 PH (ref 5–7.5)
POTASSIUM SERPL-SCNC: 3.6 MMOL/L (ref 3.5–5)
PROT SPEC-MCNC: 8 G/DL (ref 6.7–8.2)
RBC MAR: 4.68 10^6/UL (ref 4.2–5.4)
SODIUM SERPLBLD-SCNC: 138 MMOL/L (ref 135–145)
SP GR UR STRIP.AUTO: <=1.005 (ref 1–1.03)
UA CHARGE (STRIP ONLY): (no result)
UA W/ MICROSCOPIC CHARGE: YES
UR CULTURE IF IND: (no result)
UROBILINOGEN UR STRIP.AUTO-MCNC: NEGATIVE MG/DL
WBC # BLD: 8.9 X10^3/UL
WBC # UR MANUAL: (no result) /HPF (ref 0–5)

## 2017-12-11 PROCEDURE — 87086 URINE CULTURE/COLONY COUNT: CPT

## 2017-12-11 PROCEDURE — 81001 URINALYSIS AUTO W/SCOPE: CPT

## 2017-12-11 PROCEDURE — 83690 ASSAY OF LIPASE: CPT

## 2017-12-11 PROCEDURE — 80053 COMPREHEN METABOLIC PANEL: CPT

## 2017-12-11 PROCEDURE — 85025 COMPLETE CBC W/AUTO DIFF WBC: CPT

## 2017-12-11 PROCEDURE — 99283 EMERGENCY DEPT VISIT LOW MDM: CPT

## 2017-12-11 PROCEDURE — 36415 COLL VENOUS BLD VENIPUNCTURE: CPT

## 2017-12-11 PROCEDURE — 99282 EMERGENCY DEPT VISIT SF MDM: CPT

## 2017-12-11 PROCEDURE — 96372 THER/PROPH/DIAG INJ SC/IM: CPT

## 2017-12-11 PROCEDURE — 81003 URINALYSIS AUTO W/O SCOPE: CPT

## 2017-12-11 NOTE — ED PHYSICIAN DOCUMENTATION
History of Present Illness





- Stated complaint


Stated Complaint: POST OP/WEAK





- Chief complaint


Chief Complaint: Abd Pain





- History obtained from


History obtained from: Patient, Family





- History of Present Illness


Timing: How many days ago (2)


Pain level max: 5


Pain level now: 4


Improved by: rest


Worsened by: movement





- Additonal information


Additional information: 





Patient is a 37-year-old female who presents to the emergency department with 

abdominal pain, especially with coughing for the past 2 days.  She has also had 

intermittent vomiting.  No diarrhea.  No constipation.  Normal bowel movements.

  States that she had a hysterectomy 5 weeks ago. She also states that she has 

had subjective intermittent fevers and chills.  Rhinorrhea, congestion.





Review of Systems


Ten Systems: 10 systems reviewed and negative


Constitutional: denies: Fever, Chills, Myalgias


Eyes: denies: Decreased vision


Ears: denies: Ear pain


Nose: reports: Rhinorrhea / runny nose, Congestion


Throat: denies: Sore throat


Cardiac: denies: Chest pain / pressure


Respiratory: reports: Cough


GI: reports: Abdominal Pain, Nausea, Vomiting.  denies: Constipation, Diarrhea, 

Hematemesis, Bloody / black stool


Skin: denies: Rash


Musculoskeletal: denies: Neck pain, Back pain


Neurologic: denies: Headache





PD PAST MEDICAL HISTORY





- Past Medical History


Cardiovascular: None


Respiratory: None


Neuro: None


Endocrine/Autoimmune: None


GYN: Endometriosis


: Other


Musculoskeletal: Fibromyalgia





- Past Surgical History


Past Surgical History: Yes


General: Appendectomy, Other


/GYN: Tubal ligation, Hysterectomy





- Present Medications


Home Medications: 


 Ambulatory Orders











 Medication  Instructions  Recorded  Confirmed


 


raNITIdine HCl [Ranitidine HCl] 150 mg TID 09/19/16 09/16/17


 


Zolpidem [Ambien] 5 mg PO DAILY 08/01/17 12/11/17


 


Ondansetron Odt [Zofran] 4 mg TL Q6H PRN #14 tablet 09/06/17 12/11/17


 


Ondansetron Odt [Zofran] 4 mg TL Q6H PRN #15 tablet 09/16/17 12/11/17


 


Oxycodone HCl/Acetaminophen 1 each PO Q6H PRN #15 tablet 09/16/17 12/11/17





[Percocet 5-325 mg Tablet]   


 


Benzonatate [Tessalon Perle] 100 - 200 mg PO TID PRN #30 capsule 12/11/17 


 


Hydrocodone/Acetaminophen 1 - 2 each PO Q6H PRN #14 tablet 12/11/17 





[Hydrocodon-Acetaminophen 5-325]   


 


Scopolamine 1 each TD Q72H PRN #10 patch.td.3 12/11/17 














- Allergies


Allergies/Adverse Reactions: 


 Allergies











Allergy/AdvReac Type Severity Reaction Status Date / Time


 


Penicillins Allergy  Emesis Verified 08/01/17 16:21














- Social History


Does the pt smoke?: No


Smoking Status: Never smoker


Does the pt drink ETOH?: No


Does the pt have substance abuse?: No





- Immunizations


Immunizations are current?: Yes





- POLST


Patient has POLST: No





PD ED PE NORMAL





- Vitals


Vital signs reviewed: Yes





- General


General: Alert and oriented X 3





- HEENT


HEENT: Atraumatic, PERRL, Ears normal, Moist mucous membranes, Pharynx benign





- Neck


Neck: Supple, no meningeal sign





- Cardiac


Cardiac: RRR, Strong equal pulses





- Respiratory


Respiratory: No respiratory distress, Clear bilaterally





- Abdomen


Abdomen: Soft, Non tender, Non distended





- Derm


Derm: Warm and dry





- Neuro


Neuro: Alert and oriented X 3





- Psych


Psych: Normal mood, Normal affect





Results





- Vitals


Vitals: 


 Vital Signs - 24 hr











  12/11/17 12/11/17





  12:31 14:24


 


Temperature 36.3 C L 36.7 C


 


Heart Rate 98 77


 


Respiratory 18 18





Rate  


 


Blood Pressure 104/71 125/76


 


O2 Saturation 98 98








 Oxygen











O2 Source                      Room air

















- Labs


Labs: 


 Laboratory Tests











  12/11/17 12/11/17 12/11/17





  13:11 13:11 13:35


 


WBC  8.9  


 


RBC  4.68  


 


Hgb  12.3  


 


Hct  36.6 L  


 


MCV  78.3 L  


 


MCH  26.3 L  


 


MCHC  33.6  


 


RDW  14.5  


 


Plt Count  463 H  


 


MPV  7.4 L  


 


Neut #  6.2  


 


Lymph #  2.0  


 


Mono #  0.5  


 


Eos #  0.1  


 


Baso #  0.1  


 


Absolute Nucleated RBC  0.00  


 


Nucleated RBC %  0.0  


 


Sodium   138 


 


Potassium   3.6 


 


Chloride   104 


 


Carbon Dioxide   25 


 


Anion Gap   9.0 


 


BUN   8 


 


Creatinine   0.6 


 


Estimated GFR (MDRD)   112 


 


Glucose   108 H 


 


Calcium   9.5 


 


Total Bilirubin   0.4 


 


AST   15 


 


ALT   12 


 


Alkaline Phosphatase   61 


 


Total Protein   8.0 


 


Albumin   4.3 


 


Globulin   3.7 


 


Albumin/Globulin Ratio   1.2 


 


Lipase   24 


 


Urine Color    YELLOW


 


Urine Clarity    CLEAR


 


Urine pH    6.0


 


Ur Specific Gravity    <=1.005


 


Urine Protein    NEGATIVE


 


Urine Glucose (UA)    NEGATIVE


 


Urine Ketones    NEGATIVE


 


Urine Occult Blood    SMALL H


 


Urine Nitrite    NEGATIVE


 


Urine Bilirubin    NEGATIVE


 


Urine Urobilinogen    0.2 (NORMAL)


 


Ur Leukocyte Esterase    NEGATIVE


 


Urine RBC    0-5


 


Urine WBC    0-3


 


Ur Squamous Epith Cells    NONE SEEN


 


Urine Bacteria    None Seen


 


Ur Microscopic Review    INDICATED


 


Urine Culture Comments    NOT INDICATED














PD MEDICAL DECISION MAKING





- ED course


Complexity details: reviewed results, re-evaluated patient, considered 

differential, d/w patient, d/w family


ED course: 





Patient is a 37-year-old female who presents to the emergency department with 

what appears to be a viral upper respiratory infection.  No evidence of 

pneumonia clinically.  No evidence of sepsis.  Abdomen is soft, nontender 

nondistended.  Possible that she is having pain from adhesions and scarring 

that is being disrupted by her coughing.  She is very well-appearing, nontoxic.

  We will continue supportive care and follow-up with her doctor.  No acute 

laboratory abnormalities.  Patient and family counseled regarding signs and 

symptoms for which I believe and urgent re-evaluation would be necessary. 

Patient with good understanding of and agreement to plan and is comfortable 

going home at this time





This document was made in part using voice recognition software. While efforts 

are made to proofread this document, sound alike and grammatical errors may 

occur.





Departure





- Departure


Disposition: 01 Home, Self Care


Clinical Impression: 


 Viral URI





Abdominal pain


Qualifiers:


 Abdominal location: unspecified location Qualified Code(s): R10.9 - 

Unspecified abdominal pain





Condition: Good


Instructions:  ED Abdominal Pain Unkn Cause, ED Viral Syndrome


Follow-Up: 


Sonido Causey MD [Primary Care Provider] - Within 1 week


Prescriptions: 


Benzonatate [Tessalon Perle] 100 - 200 mg PO TID PRN #30 capsule


 PRN Reason: Cough


Hydrocodone/Acetaminophen [Hydrocodon-Acetaminophen 5-325] 1 - 2 each PO Q6H 

PRN #14 tablet


 PRN Reason: pain


Scopolamine 1 each TD Q72H PRN #10 patch.td.3


 PRN Reason: Nausea / Vomiting


Comments: 


Return if you worsen.  This should improve over the next few days.  Drink 

plenty of fluids and rest.





Do not drink alcohol or drive while on narcotic pain medicine. 


Note that many narcotic pain relievers also contain tylenol/acetaminophen. 

Please ensure that your total dose of acetaminophen from all sources does not 

exceed 3 grams (3000mg) per day. 


You may constipated on this medication, take a stool softener such as "Colace" 

twice a day while you are on it. Also recommend a over-the-counter laxative 

such as senna or MiraLAX any day that you do not have a bowel movement. 


If you received narcotic pain medication in the emergency department, do not 

drive or operate machinery for the next 24 hours.





Discharge Date/Time: 12/11/17 14:29

## 2018-04-06 ENCOUNTER — HOSPITAL ENCOUNTER (EMERGENCY)
Dept: HOSPITAL 76 - ED | Age: 38
Discharge: HOME | End: 2018-04-06
Payer: COMMERCIAL

## 2018-04-06 VITALS — SYSTOLIC BLOOD PRESSURE: 106 MMHG | DIASTOLIC BLOOD PRESSURE: 77 MMHG

## 2018-04-06 DIAGNOSIS — S13.9XXA: ICD-10-CM

## 2018-04-06 DIAGNOSIS — S06.0X0A: Primary | ICD-10-CM

## 2018-04-06 DIAGNOSIS — M79.7: ICD-10-CM

## 2018-04-06 DIAGNOSIS — S49.91XA: ICD-10-CM
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
## 2018-04-06 DIAGNOSIS — S99.919A: ICD-10-CM

## 2018-04-06 PROCEDURE — 70450 CT HEAD/BRAIN W/O DYE: CPT

## 2018-04-06 PROCEDURE — 99283 EMERGENCY DEPT VISIT LOW MDM: CPT

## 2018-04-06 PROCEDURE — 73030 X-RAY EXAM OF SHOULDER: CPT

## 2018-04-06 PROCEDURE — 72125 CT NECK SPINE W/O DYE: CPT

## 2018-04-06 PROCEDURE — 73610 X-RAY EXAM OF ANKLE: CPT

## 2018-04-06 NOTE — CT PRELIMINARY REPORT
Accession: [id number]

Exam: CT HEAD W/O

 

IMPRESSION: Normal head CT.

 

[facility]

 

SITE ID: [id number]

## 2018-04-06 NOTE — ED PHYSICIAN DOCUMENTATION
History of Present Illness





- Stated complaint


Stated Complaint: FELL WHOLE RT SIDE PX,ANKLE PX





- Chief complaint


Chief Complaint: Trauma Ext





- History obtained from


History obtained from: Patient





- History of Present Illness


Timing: Last night (Fell down stairs last night- tripped over her dog. Went end 

over end. More headache today with slight confusion, also vomited last night. 

Some neck pain. Worst thing in R shoulder but also B ankle pain. Walking ok. S/

P Hyst)





Review of Systems


Constitutional: denies: Fever, Chills


Cardiac: denies: Chest pain / pressure, Palpitations


Respiratory: denies: Dyspnea, Cough


GI: reports: Nausea.  denies: Abdominal Pain, Vomiting





PD PAST MEDICAL HISTORY





- Past Medical History


Cardiovascular: None


Respiratory: None


Neuro: None


Endocrine/Autoimmune: None


GYN: Endometriosis


: Other


Musculoskeletal: Fibromyalgia





- Past Surgical History


Past Surgical History: Yes


General: Appendectomy, Other


/GYN: Tubal ligation, Hysterectomy





- Present Medications


Home Medications: 


 Ambulatory Orders











 Medication  Instructions  Recorded  Confirmed


 


raNITIdine HCl [Ranitidine HCl] 150 mg TID 09/19/16 04/06/18


 


Zolpidem [Ambien] 5 mg PO DAILY 08/01/17 04/06/18


 


Ondansetron Odt [Zofran] 4 mg TL Q6H PRN #15 tablet 09/16/17 04/06/18


 


Gabapentin 1 tab PO BID PRN 04/06/18 04/06/18


 


HYDROcod/ACETAM 5/325 [Norco 5/325] 1 - 2 ea PO Q6H PRN #10 tablet 04/06/18 














- Allergies


Allergies/Adverse Reactions: 


 Allergies











Allergy/AdvReac Type Severity Reaction Status Date / Time


 


Penicillins Allergy  Emesis Verified 04/06/18 16:19














- Social History


Does the pt smoke?: No


Smoking Status: Never smoker


Does the pt drink ETOH?: No


Does the pt have substance abuse?: No





- Immunizations


Immunizations are current?: Yes





- POLST


Patient has POLST: No





PD ED PE NORMAL





- Vitals


Vital signs reviewed: Yes





- General


General: Alert and oriented X 3, No acute distress





- HEENT


HEENT: PERRL, EOMI





- Neck


Neck: Supple, no meningeal sign, No bony TTP





- Cardiac


Cardiac: RRR, No murmur





- Respiratory


Respiratory: No respiratory distress, Clear bilaterally





- Abdomen


Abdomen: Soft, Non tender





- Back


Back: No CVA TTP, No spinal TTP





- Extremities


Extremities: Other (Diffusely TTP over R shoulder, Cannot abduct at all but int/

ext rotation is OK. B ankle Mild TTP over both malleoli.)





- Neuro


Neuro: Alert and oriented X 3, Normal speech


Eye Opening: Spontaneous


Motor: Obeys Commands


Verbal: Oriented


GCS Score: 15





- Psych


Psych: Normal mood, Normal affect





Results





- Vitals


Vitals: 


 Vital Signs - 24 hr











  04/06/18





  16:16


 


Temperature 36.7 C


 


Heart Rate 93


 


Respiratory 16





Rate 


 


Blood Pressure 106/77


 


O2 Saturation 97








 Oxygen











O2 Source                      Room air

















- Rads (name of study)


  ** CT Head/Cspine


Radiology: EMP read contemporaneously (neg)





  ** B ankle and R shoulder XR


Radiology: EMP read contemporaneously (normal)





Departure





- Departure


Disposition: 01 Home, Self Care


Clinical Impression: 


 Fall down stairs





Ankle injury


Qualifiers:


 Encounter type: initial encounter Laterality: unspecified laterality Qualified 

Code(s): S99.919A - Unspecified injury of unspecified ankle, initial encounter





Shoulder injury


Qualifiers:


 Encounter type: initial encounter Laterality: right Qualified Code(s): 

S49.91XA - Unspecified injury of right shoulder and upper arm, initial encounter





Concussion


Qualifiers:


 Encounter type: initial encounter Loss of consciousness presence/duration: 

without LOC Qualified Code(s): S06.0X0A - Concussion without loss of 

consciousness, initial encounter





Neck sprain


Qualifiers:


 Encounter type: initial encounter Qualified Code(s): S13.9XXA - Sprain of 

joints and ligaments of unspecified parts of neck, initial encounter





Condition: Good


Record reviewed to determine appropriate education?: Yes


Instructions:  ED Sprain Ankle W X Ray, ED Sprain Strain Neck


Prescriptions: 


HYDROcod/ACETAM 5/325 [Norco 5/325] 1 - 2 ea PO Q6H PRN #10 tablet


 PRN Reason: Pain


Comments: 


Call your doctor to arrange a follow-up appointment, make the next available 

appointment.  In the interim, return anytime if worse or if new symptoms 

develop.





Do not drink or drive while taking narcotic pain medication.


Note that many narcotic pain relievers also contain Tylenol/acetaminophen.  

Please ensure that your total dose of acetaminophen from all sources does not 

exceed 3 g (3000 mg) per day.


You may get constipated while on this medication.  Take a stool softener such 

as Colace twice a day while you are on it.  Also add an over-the-counter 

laxative such as senna or MiraLAX on any day that you do not have a bowel 

movement.


If you received a narcotic pain medication or sedative while in the emergency 

department, do not drive for the next 24 hours.

## 2018-04-06 NOTE — CT REPORT
EXAM:

CT CERVICAL SPINE WITHOUT CONTRAST

 

DATE: 4/6/2018 06:12 PM.

 

HISTORY: Fall, pain.

 

COMPARISONS: None.

 

TECHNIQUE: Thin-section axial images were acquired of the cervical spine without contrast. Post-proce
ssing: Coronal and sagittal reformats. Other: None.

 

In accordance with CT protocol optimization, one or more of the following dose reduction techniques w
ere utilized for this exam: automated exposure control, adjustment of mA and/or KV based on patient s
ize, or use of iterative reconstructive technique.

 

FINDINGS: 

Alignment: No scoliosis or spondylolisthesis.

 

Bones: No fracture or bone lesion.

 

Interspace Levels/Facets: Disk spaces well preserved. No degenerative changes.

 

Musculature: Unremarkable.

 

Other: The paravertebral and prevertebral soft tissues are unremarkable. The lung apices are clear.

 

IMPRESSION: Normal cervical spine CT.

 

[facility]

Referring Provider Line: [phone]

 

SITE ID: [id number]

## 2018-04-06 NOTE — XRAY REPORT
EXAM:

RIGHT SHOULDER RADIOGRAPHY

 

EXAM DATE: 4/6/2018 05:35 PM.

 

CLINICAL HISTORY: Fall down stairs, head neck R shoulder, B ankle pn.

 

COMPARISON: None.

 

TECHNIQUE: 3 views.

 

FINDINGS: 

Bones: Normal. No fracture or bone lesion.

 

Joints: The glenohumeral and acromioclavicular joints are normal.

 

Soft tissues: Unremarkable. Clear visualized lung.

 

IMPRESSION: Normal shoulder radiography.

 

[facility]

Referring Provider Line: [phone]

 

SITE ID: [id number]

## 2018-04-06 NOTE — CT REPORT
EXAM:

CT HEAD

 

EXAM DATE: 4/6/2018 05:53 PM.

 

CLINICAL HISTORY: Fall, pain.

 

COMPARISON: None.

 

TECHNIQUE: Multiaxial CT images were obtained from the foramen magnum to the vertex. Reformats: Coron
al. IV contrast: None.

 

In accordance with CT protocol optimization, one or more of the following dose reduction techniques w
ere utilized for this exam: automated exposure control, adjustment of mA and/or KV based on patient s
ize, or use of iterative reconstructive technique.

 

FINDINGS:

Parenchyma: No intraparenchymal hemorrhage. No evidence of mass, midline shift, or CT findings of inf
arction. Gray-white differentiation is distinct. 

 

Extraaxial Spaces: Normal for age. No subdural or epidural collections.

 

Ventricles: Normal in size and position.

 

Sinuses and Orbits: Imaged paranasal sinuses, orbits, and mastoids show no significant abnormality.

 

Bones: Unremarkable.

 

Other: None.

 

IMPRESSION: Normal head CT.

 

[facility]

Referring Provider Line: [phone]

 

SITE ID: [id number]

## 2018-04-06 NOTE — XRAY REPORT
EXAM:

BILATERAL ANKLE RADIOGRAPHY

 

EXAM DATE: 4/6/2018 05:35 PM.

 

CLINICAL HISTORY: Fall down stairs, head neck R shoulder, B ankle pn.

 

COMPARISON: None.

 

TECHNIQUE: Each ankle, 3 views.

 

FINDINGS: 

Bones: Normal. No fractures or bone lesions.

 

Joints: Normal. No effusion. No subluxations. The ankle mortise is normally aligned.

 

Soft Tissues: Mild soft tissue swelling over the malleoli.

 

IMPRESSION: Soft tissue swelling.

 

[facility]

Referring Provider Line: [phone]

 

SITE ID: [id number]

## 2018-04-19 ENCOUNTER — HOSPITAL ENCOUNTER (EMERGENCY)
Dept: HOSPITAL 76 - ED | Age: 38
Discharge: HOME | End: 2018-04-19
Payer: COMMERCIAL

## 2018-04-19 VITALS — SYSTOLIC BLOOD PRESSURE: 113 MMHG | DIASTOLIC BLOOD PRESSURE: 73 MMHG

## 2018-04-19 DIAGNOSIS — S30.0XXA: ICD-10-CM
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
## 2018-04-19 DIAGNOSIS — S39.012A: Primary | ICD-10-CM

## 2018-04-19 DIAGNOSIS — M79.7: ICD-10-CM

## 2018-04-19 PROCEDURE — 99283 EMERGENCY DEPT VISIT LOW MDM: CPT

## 2018-04-19 NOTE — ED PHYSICIAN DOCUMENTATION
PD HPI BACK INJURY





- Stated complaint


Stated Complaint: BACK PX





- History obtained from


History obtained from: Patient





- History of Present Illness


Type of injury: Fall


Where injury occurred: Home


Timing - details: Gradual onset


Quality: Pain, Spasm


Worsened by: Moving, Palpating


Associated symptoms: No: Fever, Weakness, Numbness, Incontinent of urine


Contributing factors: No: Prior back surgery, Work related


Similar symptoms before: Diagnosis (low back pain in the past and has history 

of fibromylagia.)


Recently seen: Emergency Dept (4/6/18 and had CTs of spine and back as well as 

other xrays at that time.)





Review of Systems


Constitutional: denies: Fever, Chills


: denies: Incontinent


Skin: denies: Rash, Lesions


Neurologic: denies: Focal weakness, Numbness





PD PAST MEDICAL HISTORY





- Past Medical History


Past Medical History: Yes


Cardiovascular: None


Respiratory: None


Neuro: None


Endocrine/Autoimmune: None


GYN: Endometriosis


: Other


Musculoskeletal: Fibromyalgia





- Past Surgical History


Past Surgical History: Yes


General: Appendectomy, Other


/GYN: Tubal ligation, Hysterectomy





- Present Medications


Home Medications: 


 Ambulatory Orders











 Medication  Instructions  Recorded  Confirmed


 


raNITIdine HCl [Ranitidine HCl] 150 mg TID 09/19/16 04/06/18


 


Zolpidem [Ambien] 5 mg PO DAILY 08/01/17 04/06/18


 


Ondansetron Odt [Zofran] 4 mg TL Q6H PRN #15 tablet 09/16/17 04/06/18


 


Gabapentin 1 tab PO BID PRN 04/06/18 04/06/18


 


HYDROcod/ACETAM 5/325 [Norco 5/325] 1 - 2 ea PO Q6H PRN #10 tablet 04/06/18 


 


Dexamethasone [Decadron] 4 mg PO DAILY #5 tablet 04/19/18 


 


HYDROcod/ACETAM 5/325 [Norco 5/325] 1 tab PO Q6H PRN #20 tablet 04/19/18 














- Allergies


Allergies/Adverse Reactions: 


 Allergies











Allergy/AdvReac Type Severity Reaction Status Date / Time


 


Penicillins Allergy  Emesis Verified 04/06/18 16:19














- Social History


Does the pt smoke?: No


Smoking Status: Never smoker


Does the pt drink ETOH?: No


Does the pt have substance abuse?: No





- Immunizations


Immunizations are current?: Yes





- POLST


Patient has POLST: No





PD ED PE NORMAL





- Vitals


Vital signs reviewed: Yes





- General


General: Alert and oriented X 3, Well developed/nourished





- Neck


Neck: Supple, no meningeal sign, No adenopathy





- Abdomen


Abdomen: Normal bowel sounds, Soft, Non tender, Non distended





- Back


Back: No CVA TTP, No spinal TTP (just tender in paralumbar muscles, right more. 

No rash nor redness. )





Results





- Vitals


Vitals: 


 Vital Signs - 24 hr











  04/19/18





  19:34


 


Temperature 36.0 C L


 


Heart Rate 90


 


Respiratory 18





Rate 


 


Blood Pressure 113/73


 


O2 Saturation 98








 Oxygen











O2 Source                      Room air

















PD MEDICAL DECISION MAKING





- ED course


Complexity details: reviewed old records, considered differential (she says she 

gets feeling bad or nauseated with Flexeril, Robaxin, and each other muscle 

relaxant I name, so will stay with the steroids and hydrocodone. ), d/w patient





Departure





- Departure


Disposition: 01 Home, Self Care


Clinical Impression: 


Fall down stairs


Qualifiers:


 Encounter type: subsequent encounter Qualified Code(s): W10.8XXD - Fall (on) (

from) other stairs and steps, subsequent encounter





Back contusion


Qualifiers:


 Encounter type: subsequent encounter Laterality: right Qualified Code(s): 

S20.221D - Contusion of right back wall of thorax, subsequent encounter





Back strain


Qualifiers:


 Encounter type: subsequent encounter Qualified Code(s): S39.012D - Strain of 

muscle, fascia and tendon of lower back, subsequent encounter





Condition: Stable


Record reviewed to determine appropriate education?: Yes


Instructions:  ED Contusion Back


Follow-Up: 


SANTI MTZ MD [Primary Care Provider] - 


Prescriptions: 


Dexamethasone [Decadron] 4 mg PO DAILY #5 tablet


HYDROcod/ACETAM 5/325 [Norco 5/325] 1 tab PO Q6H PRN #20 tablet


 PRN Reason: Pain


Comments: 


Heat and gentle stretching for the back.  You could try Decadron steroid anti-

inflammatory daily for several days.  Since you are are sensitive to the muscle 

relaxants we won't use any of those.  Add Tylenol or hydrocodone if needed for 

pain.  Follow-up with your primary care next week.


Discharge Date/Time: 04/19/18 21:01

## 2018-05-29 ENCOUNTER — HOSPITAL ENCOUNTER (EMERGENCY)
Dept: HOSPITAL 76 - ED | Age: 38
Discharge: HOME | End: 2018-05-29
Payer: COMMERCIAL

## 2018-05-29 VITALS — SYSTOLIC BLOOD PRESSURE: 125 MMHG | DIASTOLIC BLOOD PRESSURE: 76 MMHG
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
## 2018-05-29 DIAGNOSIS — S39.012A: Primary | ICD-10-CM

## 2018-05-29 DIAGNOSIS — M79.7: ICD-10-CM

## 2018-05-29 PROCEDURE — 96372 THER/PROPH/DIAG INJ SC/IM: CPT

## 2018-05-29 PROCEDURE — 99283 EMERGENCY DEPT VISIT LOW MDM: CPT

## 2018-05-29 NOTE — ED PHYSICIAN DOCUMENTATION
PD HPI BACK PAIN





- Stated complaint


Stated Complaint: BK PX





- Chief complaint


Chief Complaint: Back Pain





- History obtained from


History obtained from: Patient





- History of Present Illness


Timing - onset: How many weeks ago (6)


Timing - duration: Weeks (6)


Timing - details: Gradual onset


Pain level max: 8


Pain level now: 8


Location: Lower, Right, Left


Quality: Pain, Spasm, Similar to prior episodes


Associated symptoms: No: Fever, Weakness, Numbness, Incontinent of urine, 

Unable to urinate, Hematuria, Incontinent of stool


Improves with: Meds (vicodin)


Worsened by: Movement, Lifting, Twisting


Contributing factors: Other (states fell last month, pain since. Has had a CT 

and xrays.).  No: Anticoagulated, Cancer, IVDA


Recently seen: Clinic, Emergency Dept





Review of Systems


Constitutional: denies: Fever, Chills


Nose: denies: Rhinorrhea / runny nose, Congestion


Respiratory: denies: Cough


GI: denies: Nausea, Vomiting, Diarrhea


Skin: denies: Rash


Musculoskeletal: denies: Neck pain


Neurologic: denies: Focal weakness, Numbness, Headache, Head injury





PD PAST MEDICAL HISTORY





- Past Medical History


Cardiovascular: None


Respiratory: None


Endocrine/Autoimmune: None


GYN: Endometriosis


: Other


Musculoskeletal: Fibromyalgia





- Past Surgical History


Past Surgical History: Yes


General: Appendectomy, Other


/GYN: Tubal ligation, Hysterectomy





- Present Medications


Home Medications: 


 Ambulatory Orders











 Medication  Instructions  Recorded  Confirmed


 


raNITIdine HCl [Ranitidine HCl] 150 mg TID 09/19/16 04/06/18


 


Zolpidem [Ambien] 5 mg PO DAILY 08/01/17 04/06/18


 


Ondansetron Odt [Zofran] 4 mg TL Q6H PRN #15 tablet 09/16/17 04/06/18


 


Gabapentin 1 tab PO BID PRN 04/06/18 04/06/18


 


HYDROcod/ACETAM 5/325 [Norco 5/325] 1 - 2 ea PO Q6H PRN #10 tablet 04/06/18 


 


Dexamethasone [Decadron] 4 mg PO DAILY #5 tablet 04/19/18 


 


HYDROcod/ACETAM 5/325 [Norco 5/325] 1 tab PO Q6H PRN #20 tablet 04/19/18 


 


Hydrocodone/Acetaminophen 1 - 2 each PO Q6H PRN #14 tablet 05/29/18 





[Hydrocodon-Acetaminophen 5-325]   














- Allergies


Allergies/Adverse Reactions: 


 Allergies











Allergy/AdvReac Type Severity Reaction Status Date / Time


 


Penicillins Allergy  Emesis Verified 05/29/18 21:56














- Social History


Does the pt smoke?: No


Smoking Status: Never smoker


Does the pt drink ETOH?: No


Does the pt have substance abuse?: No





- Immunizations


Immunizations are current?: Yes





- POLST


Patient has POLST: No





PD ED PE NORMAL





- Vitals


Vital signs reviewed: Yes





- General


General: Alert and oriented X 3, No acute distress





- HEENT


HEENT: Moist mucous membranes





- Neck


Neck: Supple, no meningeal sign





- Cardiac


Cardiac: RRR, Strong equal pulses





- Respiratory


Respiratory: No respiratory distress, Clear bilaterally





- Abdomen


Abdomen: Soft, Non tender, Non distended





- Back


Back: No spinal TTP, Other (No midline tenderness to palpation or percussion.  

She does have mild spasm bilateral lower lumbar.)





- Derm


Derm: Warm and dry





- Extremities


Extremities: No edema, No calf tenderness / cord, Other (normal bilateral lower 

extremity patellar and ankle jerk reflexes. Normal great toe extension 

bilaterally)





- Neuro


Neuro: Alert and oriented X 3, No motor deficit, No sensory deficit





Results





- Vitals


Vitals: 


 Vital Signs - 24 hr











  05/29/18 05/29/18





  21:52 23:19


 


Temperature 36.1 C L 


 


Heart Rate 92 88


 


Respiratory 17 15





Rate  


 


Blood Pressure 123/74 125/76


 


O2 Saturation 98 99








 Oxygen











O2 Source                      Room air

















PD MEDICAL DECISION MAKING





- ED course


Complexity details: reviewed old records (Prior ED visits), considered 

differential (no cauda equina, no spinal epidural abscess, no fracture, no 

aortic dissection or evidence of aneursym rupture), d/w patient


ED course: 





Patient is a 38-year-old female with continued back pain.  Out of her 

hydrocodone.  Will prescribe a small amount for home and follow-up closely with 

her doctor.  She did not respond well to steroids last time, so I will not 

prescribe these at this time.  She also declines any NSAIDs or meloxicam.  She 

was given a dose of Toradol in the emergency department as she is driving 

tonight.  No evidence of cauda equina, epidural abscess.  Patient counseled 

regarding signs and symptoms for which I believe and urgent re-evaluation would 

be necessary. Patient with good understanding of and agreement to plan and is 

comfortable going home at this time





This document was made in part using voice recognition software. While efforts 

are made to proofread this document, sound alike and grammatical errors may 

occur.





Departure





- Departure


Disposition: 01 Home, Self Care


Clinical Impression: 


Back strain


Qualifiers:


 Encounter type: initial encounter Qualified Code(s): S39.012A - Strain of 

muscle, fascia and tendon of lower back, initial encounter





Condition: Good


Instructions:  ED Low Back Pain Injury


Follow-Up: 


SANTI MTZ MD [Physician No Access] - Within 1 week


Prescriptions: 


Hydrocodone/Acetaminophen [Hydrocodon-Acetaminophen 5-325] 1 - 2 each PO Q6H 

PRN #14 tablet


 PRN Reason: pain


Comments: 


Return if you worsen. Make sure to follow-up with your doctor for further care.


Do not drink alcohol or drive while on narcotic pain medicine. 


Note that many narcotic pain relievers also contain tylenol/acetaminophen. 

Please ensure that your total dose of acetaminophen from all sources does not 

exceed 3 grams (3000mg) per day. 


You may constipated on this medication, take a stool softener such as "Colace" 

twice a day while you are on it. Also recommend a over-the-counter laxative 

such as senna or MiraLAX any day that you do not have a bowel movement. 


If you received narcotic pain medication in the emergency department, do not 

drive or operate machinery for the next 24 hours.





Discharge Date/Time: 05/29/18 23:22

## 2018-06-12 ENCOUNTER — HOSPITAL ENCOUNTER (EMERGENCY)
Dept: HOSPITAL 76 - ED | Age: 38
Discharge: HOME | End: 2018-06-12
Payer: COMMERCIAL

## 2018-06-12 VITALS — SYSTOLIC BLOOD PRESSURE: 118 MMHG | DIASTOLIC BLOOD PRESSURE: 80 MMHG

## 2018-06-12 DIAGNOSIS — M54.5: Primary | ICD-10-CM

## 2018-06-12 PROCEDURE — 99283 EMERGENCY DEPT VISIT LOW MDM: CPT

## 2018-06-12 NOTE — ED PHYSICIAN DOCUMENTATION
PD HPI BACK INJURY





- Stated complaint


Stated Complaint: BACK PX





- History obtained from


History obtained from: Patient





- History of Present Illness


Location: Other (About 2 months ago she fell, injured her back and she has had 

persistent lumbar pain ever since without weakness, numbness, tingling or 

saddle anesthesia.  She saw her doctor in physical therapy was recommended that 

they could not initiate because they are about to move out of the area.  She 

also notes that she has restless leg syndrome in her legs shake especially at 

night and she wants something for that and she has been nauseous because of the 

pain but denies fevers.  She status post remote hysterectomy so there is no 

possibility of pregnancy.)





Review of Systems


Constitutional: denies: Fever, Chills


GI: denies: Abdominal Pain, Nausea, Vomiting


: reports: Reviewed and negative





PD PAST MEDICAL HISTORY





- Past Medical History


Past Medical History: Yes


Cardiovascular: None


Respiratory: None


Endocrine/Autoimmune: None


GYN: Endometriosis


: Other


Musculoskeletal: Fibromyalgia





- Past Surgical History


Past Surgical History: Yes


General: Appendectomy, Other


/GYN: Tubal ligation, Hysterectomy





- Present Medications


Home Medications: 


 Ambulatory Orders











 Medication  Instructions  Recorded  Confirmed


 


raNITIdine HCl [Ranitidine HCl] 150 mg TID 09/19/16 04/06/18


 


Zolpidem [Ambien] 5 mg PO DAILY 08/01/17 04/06/18


 


Ondansetron Odt [Zofran] 4 mg TL Q6H PRN #15 tablet 09/16/17 04/06/18


 


Gabapentin 1 tab PO BID PRN 04/06/18 04/06/18


 


HYDROcod/ACETAM 5/325 [Norco 5/325] 1 - 2 ea PO Q6H PRN #10 tablet 04/06/18 


 


Dexamethasone [Decadron] 4 mg PO DAILY #5 tablet 04/19/18 


 


HYDROcod/ACETAM 5/325 [Norco 5/325] 1 tab PO Q6H PRN #20 tablet 04/19/18 


 


Hydrocodone/Acetaminophen 1 - 2 each PO Q6H PRN #14 tablet 05/29/18 





[Hydrocodon-Acetaminophen 5-325]   


 


HYDROcod/ACETAM 5/325 [Norco 5/325] 1 - 2 ea PO Q6H PRN #15 tablet 06/12/18 


 


clonazePAM [Clonazepam] 0.5 mg PO QPM #15 tab.rapdis 06/12/18 














- Allergies


Allergies/Adverse Reactions: 


 Allergies











Allergy/AdvReac Type Severity Reaction Status Date / Time


 


Penicillins Allergy  Emesis Verified 05/29/18 21:56














- Social History


Does the pt smoke?: No


Smoking Status: Never smoker


Does the pt drink ETOH?: No


Does the pt have substance abuse?: No





- Immunizations


Immunizations are current?: Yes





- POLST


Patient has POLST: No





PD ED PE NORMAL





- Vitals


Vital signs reviewed: Yes





- General


General: Alert and oriented X 3, No acute distress





- Abdomen


Abdomen: Soft, Non tender





- Back


Back: Other (No midline spinal tenderness, she does wince with motion and has 

paralumbar muscular tenderness. The patient has equal and normal Achilles and 

patellar reflexes bilaterally.  Normal sensation in all areas of the legs.  

Patient denies saddle anesthesia.  Normal strength in flexion-extension at the 

ankles, knees, and flexion of the hips.)





- Neuro


Neuro: Alert and oriented X 3, Normal speech





Results





- Vitals


Vitals: 





 Vital Signs - 24 hr











  06/12/18





  17:25


 


Temperature 36.4 C L


 


Heart Rate 102 H


 


Respiratory 16





Rate 


 


Blood Pressure 118/80


 


O2 Saturation 99








 Oxygen











O2 Source                      Room air

















PD MEDICAL DECISION MAKING





- Sepsis Event


Vital Signs: 





 Vital Signs - 24 hr











  06/12/18





  17:25


 


Temperature 36.4 C L


 


Heart Rate 102 H


 


Respiratory 16





Rate 


 


Blood Pressure 118/80


 


O2 Saturation 99








 Oxygen











O2 Source                      Room air

















Departure





- Departure


Disposition: 01 Home, Self Care


Clinical Impression: 


Back pain


Qualifiers:


 Back pain location: low back pain Chronicity: acute Back pain laterality: 

midline Sciatica presence: without sciatica Qualified Code(s): M54.5 - Low back 

pain





Condition: Good


Record reviewed to determine appropriate education?: Yes


Instructions:  ED Low Back Pain Injury


Prescriptions: 


clonazePAM [Clonazepam] 0.5 mg PO QPM #15 tab.rapdis


HYDROcod/ACETAM 5/325 [Norco 5/325] 1 - 2 ea PO Q6H PRN #15 tablet


 PRN Reason: Pain


Comments: 


Talk with your new doctor after your upcoming move about physical therapy.  Do 

not drink or drive on any of the prescription medications.